# Patient Record
Sex: FEMALE | Race: WHITE | URBAN - METROPOLITAN AREA
[De-identification: names, ages, dates, MRNs, and addresses within clinical notes are randomized per-mention and may not be internally consistent; named-entity substitution may affect disease eponyms.]

---

## 2022-01-14 ENCOUNTER — VIRTUAL VISIT (OUTPATIENT)
Dept: INTERNAL MEDICINE CLINIC | Age: 27
End: 2022-01-14
Payer: MEDICARE

## 2022-01-14 DIAGNOSIS — K21.9 GASTROESOPHAGEAL REFLUX DISEASE WITHOUT ESOPHAGITIS: ICD-10-CM

## 2022-01-14 DIAGNOSIS — F31.31 MILD DEPRESSED BIPOLAR I DISORDER (HCC): ICD-10-CM

## 2022-01-14 DIAGNOSIS — K58.0 IRRITABLE BOWEL SYNDROME WITH DIARRHEA: ICD-10-CM

## 2022-01-14 DIAGNOSIS — M79.10 MYALGIA: ICD-10-CM

## 2022-01-14 PROBLEM — F60.3 BORDERLINE PERSONALITY DISORDER (HCC): Status: ACTIVE | Noted: 2019-08-16

## 2022-01-14 PROBLEM — N83.202 LEFT OVARIAN CYST: Status: ACTIVE | Noted: 2020-06-30

## 2022-01-14 PROBLEM — M54.16 LUMBAR RADICULOPATHY: Status: ACTIVE | Noted: 2018-11-28

## 2022-01-14 PROCEDURE — 99204 OFFICE O/P NEW MOD 45 MIN: CPT | Performed by: NURSE PRACTITIONER

## 2022-01-14 RX ORDER — OMEPRAZOLE 40 MG/1
CAPSULE, DELAYED RELEASE ORAL
COMMUNITY
Start: 2021-09-07

## 2022-01-14 RX ORDER — METHIMAZOLE 5 MG/1
5 TABLET ORAL 2 TIMES DAILY
COMMUNITY
Start: 2021-10-12

## 2022-01-14 RX ORDER — DICYCLOMINE HYDROCHLORIDE 10 MG/1
10 CAPSULE ORAL 4 TIMES DAILY
COMMUNITY
Start: 2021-10-11

## 2022-01-14 ASSESSMENT — PATIENT HEALTH QUESTIONNAIRE - PHQ9
SUM OF ALL RESPONSES TO PHQ QUESTIONS 1-9: 0
1. LITTLE INTEREST OR PLEASURE IN DOING THINGS: 0
2. FEELING DOWN, DEPRESSED OR HOPELESS: 0
SUM OF ALL RESPONSES TO PHQ QUESTIONS 1-9: 0
SUM OF ALL RESPONSES TO PHQ QUESTIONS 1-9: 0
SUM OF ALL RESPONSES TO PHQ9 QUESTIONS 1 & 2: 0
SUM OF ALL RESPONSES TO PHQ QUESTIONS 1-9: 0

## 2022-01-14 ASSESSMENT — ENCOUNTER SYMPTOMS
BACK PAIN: 0
COUGH: 0
DIARRHEA: 1
COLOR CHANGE: 0
CONSTIPATION: 0
SINUS PRESSURE: 0
ABDOMINAL PAIN: 0
WHEEZING: 0
SINUS PAIN: 0
SHORTNESS OF BREATH: 0

## 2022-01-14 NOTE — ASSESSMENT & PLAN NOTE
Stable, controlled on current regimen  Was previously following left side however her psychiatrist has since left the office  She has not yet established with new psychiatrist

## 2022-01-14 NOTE — PROGRESS NOTES
Alethea Humphrey (:  1995) is a 32 y.o. female,Established patient, here for evaluation of the following chief complaint(s): Establish Care       ASSESSMENT/PLAN:  1. Gastroesophageal reflux disease without esophagitis  Assessment & Plan:  Stable, controlled  Continue Prilosec 40 mg  2. Mild depressed bipolar I disorder ([address]  Assessment & Plan:  Stable, controlled on current regimen  Was previously following left side however her psychiatrist has since left the office  She has not yet established with new psychiatrist  3. Irritable bowel syndrome with diarrhea  Assessment & Plan:  Typically well controlled on dicyclomine  We will see how symptoms progress after hyperthyroid is treated  Consider Xifaxan if symptoms persist  4. Myalgia  Assessment & Plan:  Has been ongoing, waxing and waning  No swelling or decreased ROM  Previous CRP, RACHANA, RF negative  We will see in office for full physicalconsider Cymbalta or amitriptyline      No follow-ups on file. SUBJECTIVE/OBJECTIVE:  HPI  Patient is here to establish care. She has previously being seen by a PCP at [facility] but recently had insurance changes and needs to switch PCPs. Reports overactive thyroid that was noted on her routine labs last year. She had an appointment with endocrinology yesterday. She is started Tapazole. Waiting on further labs. She does have a family history of Graves' disease in her father. Reports chronic widespread joint pain that has been ongoing in her shoulders and neck as well as her lower back and thighs. Radiates down the arms and legs. Feels like sharp/ tingling pain. Comes and goes. Started a few years ago. Has not had work up for this in the past. Took gabapentin for psych disorder but helped with the pain. Does not like to take tylenol or ibuprofen. Notices pain daily. She had previous RF, CRP, RACHANA done last year which were negative. Patient has past medical history of IBS.   States her diarrhea symptoms pursuant to the emergency declaration under the [facility], [facility] authority and the [employer] General Act. Patient identification was verified, and a caregiver was present when appropriate. The patient was located in a state where the provider was credentialed to provide care. An electronic signature was used to authenticate this note.     --ASHOK Lee - CNP

## 2022-01-14 NOTE — ASSESSMENT & PLAN NOTE
Has been ongoing, waxing and waning  No swelling or decreased ROM  Previous CRP, RACHANA, RF negative  We will see in office for full physicalconsider Cymbalta or amitriptyline

## 2022-01-14 NOTE — ASSESSMENT & PLAN NOTE
Typically well controlled on dicyclomine  We will see how symptoms progress after hyperthyroid is treated  Consider Xifaxan if symptoms persist

## 2022-04-12 ENCOUNTER — OFFICE VISIT (OUTPATIENT)
Dept: FAMILY MEDICINE CLINIC | Age: 27
End: 2022-04-12
Payer: MEDICARE

## 2022-04-12 VITALS
TEMPERATURE: 97.5 F | OXYGEN SATURATION: 97 % | WEIGHT: 124 LBS | DIASTOLIC BLOOD PRESSURE: 74 MMHG | SYSTOLIC BLOOD PRESSURE: 120 MMHG | HEIGHT: 62 IN | BODY MASS INDEX: 22.82 KG/M2 | HEART RATE: 87 BPM

## 2022-04-12 DIAGNOSIS — E05.00 GRAVES DISEASE: Primary | ICD-10-CM

## 2022-04-12 DIAGNOSIS — K58.0 IRRITABLE BOWEL SYNDROME WITH DIARRHEA: ICD-10-CM

## 2022-04-12 DIAGNOSIS — M54.16 LUMBAR RADICULOPATHY: ICD-10-CM

## 2022-04-12 DIAGNOSIS — M54.11 RADICULOPATHY OF OCCIPITO-ATLANTO-AXIAL REGION: ICD-10-CM

## 2022-04-12 PROBLEM — M79.10 MYALGIA: Status: RESOLVED | Noted: 2022-01-14 | Resolved: 2022-04-12

## 2022-04-12 LAB — PAP SMEAR, EXTERNAL: NORMAL

## 2022-04-12 PROCEDURE — 99213 OFFICE O/P EST LOW 20 MIN: CPT | Performed by: NURSE PRACTITIONER

## 2022-04-12 RX ORDER — LAMOTRIGINE 100 MG/1
100 TABLET ORAL 2 TIMES DAILY
Qty: 30 TABLET | Refills: 3 | Status: SHIPPED | OUTPATIENT
Start: 2022-04-12 | End: 2022-06-20

## 2022-04-12 NOTE — ASSESSMENT & PLAN NOTE
Suspect this to be the cause of her UE tingling. No acute distress at this time. Discussed ergonomics and head and shoulder carriage as cause of some of her discomfort.  Referral to Sports Meds/Chiro for PT and eval.  Follow up as needed

## 2022-04-12 NOTE — PROGRESS NOTES
Nichol Bloom (:  1995) is a 32 y.o. female,Established patient, here for evaluation of the following chief complaint(s):  Joint Pain      ASSESSMENT/PLAN:  1. Graves disease  Assessment & Plan:   Borderline controlled, continue current medications   Tapazole 7.5 mg daily  Due for repeat TSH/T4 in 2-3 weeks  2. Lumbar radiculopathy  Assessment & Plan:  Suspect this to be the cause of her UE tingling. No acute distress at this time. Discussed ergonomics and head and shoulder carriage as cause of some of her discomfort. Referral to Sports Meds/Chiro for PT and eval.  Follow up as needed  Orders:  -     External Referral To Chiropractic  3. Irritable bowel syndrome with diarrhea  Assessment & Plan:  Well controlled with Bentyl  4. Radiculopathy of chogtudr-cmqksjq-konly region  Assessment & Plan:  Suspect this to be the cause of her UE tingling. No acute distress at this time. Discussed ergonomics and head and shoulder carriage as cause of some of her discomfort. Referral to Sports Meds/Chiro for PT and eval.  Follow up as needed      No follow-ups on file. SUBJECTIVE/OBJECTIVE:  Here to establish I think. Has seen multiple providers but wants to establish with Troy Dennis NP. Has h/o graves and is tx with Methimazole. Recent increase to 7.5mg following and elevated T4. Due to be rechecked in 2-3 weeks. Has f/u appt with endo scheduled  Has h/o Bipolar with olvin- feels well controlled on meds. Sees psych for this. Has c/o chronic bilat arm tingling and leg tingling. Often worse at night. No weakness or incont. No headache.   Sits at computer much of day      Current Outpatient Medications   Medication Sig Dispense Refill    lamoTRIgine (LAMICTAL) 100 MG tablet Take 1 tablet by mouth 2 times daily 30 tablet 3    dicyclomine (BENTYL) 10 MG capsule Take 10 mg by mouth 4 times daily      methIMAzole (TAPAZOLE) 5 MG tablet Take 5 mg by mouth 2 times daily      omeprazole (PRILOSEC) 40 MG delayed release capsule TAKE ONE CAPSULE BY MOUTH EVERY MORNING BEFORE BREAKFAST      Levonorgestrel [facility]) IUD 19.5 mg 1 Intra Uterine Device by IntraUTERine route once      clonazePAM (KLONOPIN) 1 MG tablet Take 1 mg by mouth 2 times daily as needed       No current facility-administered medications for this visit. Review of Systems   All other systems reviewed and are negative. Vitals:    04/12/22 1318   BP: 120/74   Site: Left Upper Arm   Position: Sitting   Cuff Size: Medium Adult   Pulse: 87   Temp: 97.5 °F (36.4 °C)   SpO2: 97%   Weight: 124 lb (56.2 kg)   Height: 5' 2\" (1.575 m)       Physical Exam  Constitutional:       Appearance: She is well-developed. HENT:      Head: Normocephalic. Eyes:      Pupils: Pupils are equal, round, and reactive to light. Cardiovascular:      Rate and Rhythm: Normal rate and regular rhythm. Heart sounds: Normal heart sounds. Pulmonary:      Effort: Pulmonary effort is normal.      Breath sounds: Normal breath sounds. Musculoskeletal:         General: Normal range of motion. Cervical back: Normal range of motion. Skin:     General: Skin is warm and dry. Neurological:      Mental Status: She is alert and oriented to person, place, and time. Psychiatric:         Attention and Perception: Attention normal.         Mood and Affect: Mood is anxious. Speech: Speech is rapid and pressured. Behavior: Behavior is hyperactive. Thought Content: Thought content normal.         Cognition and Memory: Cognition normal.         Judgment: Judgment normal.                 An electronic signature was used to authenticate this note.     --ASHOK Pinedo - CNP

## 2022-04-12 NOTE — ASSESSMENT & PLAN NOTE
Borderline controlled, continue current medications   Tapazole 7.5 mg daily  Due for repeat TSH/T4 in 2-3 weeks

## 2022-06-20 RX ORDER — LAMOTRIGINE 100 MG/1
TABLET ORAL
Qty: 30 TABLET | Refills: 3 | Status: SHIPPED | OUTPATIENT
Start: 2022-06-20

## 2022-11-16 ENCOUNTER — TELEPHONE (OUTPATIENT)
Dept: FAMILY MEDICINE CLINIC | Age: 27
End: 2022-11-16

## 2022-11-16 RX ORDER — LAMOTRIGINE 100 MG/1
TABLET ORAL
Qty: 30 TABLET | Refills: 3 | Status: SHIPPED | OUTPATIENT
Start: 2022-11-16 | End: 2022-11-18 | Stop reason: SDUPTHER

## 2022-11-16 NOTE — TELEPHONE ENCOUNTER
Pt lamictal script was only written for 30 tablets, would only last 15 days at one pill twice a day.

## 2022-11-18 ENCOUNTER — TELEPHONE (OUTPATIENT)
Dept: FAMILY MEDICINE CLINIC | Age: 27
End: 2022-11-18

## 2022-11-18 RX ORDER — LAMOTRIGINE 100 MG/1
TABLET ORAL
Qty: 60 TABLET | Refills: 3 | Status: SHIPPED | OUTPATIENT
Start: 2022-11-18 | End: 2022-11-21 | Stop reason: SDUPTHER

## 2022-11-18 NOTE — TELEPHONE ENCOUNTER
Pharmacy is stating that they need a clarification on an rx:    Lamotrigine 100 mg tablets    Is the quantity for a 30 or 90 days? The original rx was written for 15 day.     LOV:  4/12/22

## 2022-11-21 RX ORDER — LAMOTRIGINE 100 MG/1
TABLET ORAL
Qty: 60 TABLET | Refills: 3 | Status: SHIPPED | OUTPATIENT
Start: 2022-11-21

## 2023-02-02 ENCOUNTER — ANESTHESIA EVENT (OUTPATIENT)
Dept: ENDOSCOPY | Age: 28
End: 2023-02-02
Payer: MEDICARE

## 2023-02-03 ENCOUNTER — ANESTHESIA (OUTPATIENT)
Dept: ENDOSCOPY | Age: 28
End: 2023-02-03
Payer: MEDICARE

## 2023-02-03 ENCOUNTER — HOSPITAL ENCOUNTER (OUTPATIENT)
Age: 28
Setting detail: OUTPATIENT SURGERY
Discharge: HOME | End: 2023-02-03
Attending: INTERNAL MEDICINE | Admitting: INTERNAL MEDICINE
Payer: MEDICARE

## 2023-02-03 VITALS
OXYGEN SATURATION: 96 % | WEIGHT: 133 LBS | RESPIRATION RATE: 16 BRPM | TEMPERATURE: 97.4 F | BODY MASS INDEX: 24.48 KG/M2 | SYSTOLIC BLOOD PRESSURE: 119 MMHG | DIASTOLIC BLOOD PRESSURE: 76 MMHG | HEIGHT: 62 IN | HEART RATE: 52 BPM

## 2023-02-03 LAB — PREGNANCY, URINE: NEGATIVE

## 2023-02-03 PROCEDURE — 2500000003 HC RX 250 WO HCPCS: Performed by: NURSE ANESTHETIST, CERTIFIED REGISTERED

## 2023-02-03 PROCEDURE — 6360000002 HC RX W HCPCS: Performed by: NURSE ANESTHETIST, CERTIFIED REGISTERED

## 2023-02-03 PROCEDURE — 3609017100 HC EGD: Performed by: INTERNAL MEDICINE

## 2023-02-03 PROCEDURE — 84703 CHORIONIC GONADOTROPIN ASSAY: CPT

## 2023-02-03 PROCEDURE — 3700000000 HC ANESTHESIA ATTENDED CARE: Performed by: INTERNAL MEDICINE

## 2023-02-03 PROCEDURE — 7100000011 HC PHASE II RECOVERY - ADDTL 15 MIN: Performed by: INTERNAL MEDICINE

## 2023-02-03 PROCEDURE — 7100000010 HC PHASE II RECOVERY - FIRST 15 MIN: Performed by: INTERNAL MEDICINE

## 2023-02-03 RX ORDER — LIDOCAINE HYDROCHLORIDE 20 MG/ML
INJECTION, SOLUTION EPIDURAL; INFILTRATION; INTRACAUDAL; PERINEURAL PRN
Status: DISCONTINUED | OUTPATIENT
Start: 2023-02-03 | End: 2023-02-03 | Stop reason: SDUPTHER

## 2023-02-03 RX ORDER — ETONOGESTREL AND ETHINYL ESTRADIOL 11.7; 2.7 MG/1; MG/1
INSERT, EXTENDED RELEASE VAGINAL
COMMUNITY
Start: 2023-01-08

## 2023-02-03 RX ORDER — SODIUM CHLORIDE 9 MG/ML
INJECTION, SOLUTION INTRAVENOUS PRN
Status: CANCELLED | OUTPATIENT
Start: 2023-02-03

## 2023-02-03 RX ORDER — PROPOFOL 10 MG/ML
INJECTION, EMULSION INTRAVENOUS PRN
Status: DISCONTINUED | OUTPATIENT
Start: 2023-02-03 | End: 2023-02-03 | Stop reason: SDUPTHER

## 2023-02-03 RX ORDER — SODIUM CHLORIDE 0.9 % (FLUSH) 0.9 %
5-40 SYRINGE (ML) INJECTION EVERY 12 HOURS SCHEDULED
Status: CANCELLED | OUTPATIENT
Start: 2023-02-03

## 2023-02-03 RX ORDER — SODIUM CHLORIDE 0.9 % (FLUSH) 0.9 %
5-40 SYRINGE (ML) INJECTION PRN
Status: CANCELLED | OUTPATIENT
Start: 2023-02-03

## 2023-02-03 RX ORDER — ERGOCALCIFEROL 1.25 MG/1
CAPSULE ORAL
COMMUNITY
Start: 2023-01-02

## 2023-02-03 RX ORDER — SODIUM CHLORIDE, SODIUM LACTATE, POTASSIUM CHLORIDE, CALCIUM CHLORIDE 600; 310; 30; 20 MG/100ML; MG/100ML; MG/100ML; MG/100ML
INJECTION, SOLUTION INTRAVENOUS CONTINUOUS
Status: DISCONTINUED | OUTPATIENT
Start: 2023-02-03 | End: 2023-02-03 | Stop reason: HOSPADM

## 2023-02-03 RX ORDER — CETIRIZINE HYDROCHLORIDE 10 MG/1
TABLET ORAL
COMMUNITY
Start: 2022-12-08

## 2023-02-03 RX ADMIN — PROPOFOL 50 MG: 10 INJECTION, EMULSION INTRAVENOUS at 08:00

## 2023-02-03 RX ADMIN — LIDOCAINE HYDROCHLORIDE 5 MG: 20 INJECTION, SOLUTION EPIDURAL; INFILTRATION; INTRACAUDAL; PERINEURAL at 07:58

## 2023-02-03 RX ADMIN — PROPOFOL 150 MG: 10 INJECTION, EMULSION INTRAVENOUS at 07:58

## 2023-02-03 ASSESSMENT — PAIN SCALES - WONG BAKER: WONGBAKER_NUMERICALRESPONSE: 0

## 2023-02-03 ASSESSMENT — PAIN - FUNCTIONAL ASSESSMENT: PAIN_FUNCTIONAL_ASSESSMENT: 0-10

## 2023-02-03 NOTE — ANESTHESIA PRE PROCEDURE
Department of Anesthesiology  Preprocedure Note       Name:  Prem Bhandari   Age:  25 y.o.  :  1995                                          MRN:  [id number]         Date:  2/3/2023      Surgeon: Juju Gilliam):  Susie Cyr MD    Procedure: Procedure(s):  ESOPHAGOGASTRODUODENOSCOPY    Medications prior to admission:   Prior to Admission medications    Medication Sig Start Date End Date Taking? Authorizing Provider   cetirizine (ZYRTEC) 10 MG tablet  22   Historical Provider, MD   vitamin D (ERGOCALCIFEROL) 1.25 MG (29032 UT) CAPS capsule  23   Historical Provider, MD   etonogestrel-ethinyl estradiol ([address]) 0.12-0.015 MG/24HR vaginal ring  23   Historical Provider, MD   lamoTRIgine (LAMICTAL) 100 MG tablet TAKE ONE TABLET BY MOUTH TWICE A DAY 22   ASHOK Bueno - CNP   dicyclomine (BENTYL) 10 MG capsule Take 10 mg by mouth 4 times daily 10/11/21   Historical Provider, MD   methIMAzole (TAPAZOLE) 5 MG tablet Take 5 mg by mouth 2 times daily 10/12/21   Historical Provider, MD   omeprazole (PRILOSEC) 40 MG delayed release capsule TAKE ONE CAPSULE BY MOUTH EVERY MORNING BEFORE BREAKFAST 21   Historical Provider, MD   Levonorgestrel [facility]) IUD 19.5 mg 1 Intra Uterine Device by IntraUTERine route once  Patient not taking: Reported on 2/3/2023 12/17/18 12/17/23  Historical Provider, MD   clonazePAM (KLONOPIN) 1 MG tablet Take 1 mg by mouth 2 times daily as needed    Historical Provider, MD       Current medications:    Current Facility-Administered Medications   Medication Dose Route Frequency Provider Last Rate Last Admin    lactated ringers IV soln infusion   IntraVENous Continuous Jaciel Rodriguez MD           Allergies:     Allergies   Allergen Reactions    Dust Mite Extract Cough     Sneezing, nasal drainage       Problem List:    Patient Active Problem List   Diagnosis Code    Attention deficit disorder F98.8    Bipolar 2 disorder (HCC) F31.81    Borderline personality disorder ([facility]) F60.3   • Depressed bipolar I disorder in full remission ([facility]) F31.76   • Left ovarian cyst N83.202   • Radiculopathy of maroqswa-hdjrrwu-tqczo region M54.11   • Mild depressed bipolar I disorder ([facility]) F31.31   • Irritable bowel syndrome with diarrhea K58.0   • Graves disease E05.00       Past Medical History:        Diagnosis Date   • ADHD (attention deficit hyperactivity disorder)    • Anxiety    • Bipolar 1 disorder ([facility])    • Borderline personality disorder ([facility])    • Hyperthyroidism    • IBS (irritable bowel syndrome)        Past Surgical History:        Procedure Laterality Date   • APPENDECTOMY         Social History:    Social History     Tobacco Use   • Smoking status: Former     Packs/day: 1.00     Years: 5.00     Pack years: 5.00     Types: Cigarettes     Quit date: 2019     Years since quittin.0   • Smokeless tobacco: Never   Substance Use Topics   • Alcohol use: Yes     Comment: rarely                                 Counseling given: Not Answered      Vital Signs (Current):   Vitals:    23 0715   BP: (!) 136/100   Pulse: 52   Resp: 16   Temp: 97.9 °F (36.6 °C)   TempSrc: Temporal   SpO2: 98%   Weight: 133 lb (60.3 kg)   Height: 5' 2\" (1.575 m)                                              BP Readings from Last 3 Encounters:   23 (!) 136/100   22 120/74   16 118/84       NPO Status:                                                                                 BMI:   Wt Readings from Last 3 Encounters:   23 133 lb (60.3 kg)   22 124 lb (56.2 kg)   16 116 lb (52.6 kg)     Body mass index is 24.33 kg/m².    CBC:   Lab Results   Component Value Date/Time    WBC 6.2 2015 12:35 AM    RBC 4.77 2015 12:35 AM    HGB 13.6 2015 12:35 AM    HCT 41.0 2015 12:35 AM    MCV 86.1 2015 12:35 AM    RDW 13.0 2015 12:35 AM     2015 12:35 AM       CMP:   Lab Results   Component Value Date/Time    NA  139 05/25/2015 12:35 AM    K 3.4 05/25/2015 12:35 AM     05/25/2015 12:35 AM    CO2 25 05/25/2015 12:35 AM    BUN 6 05/25/2015 12:35 AM    CREATININE 0.7 05/25/2015 12:35 AM    GFRAA >60 05/25/2015 12:35 AM    LABGLOM >60 05/25/2015 12:35 AM    GLUCOSE 91 05/25/2015 12:35 AM    CALCIUM 9.6 05/25/2015 12:35 AM       POC Tests: No results for input(s): POCGLU, POCNA, POCK, POCCL, POCBUN, POCHEMO, POCHCT in the last 72 hours. Coags: No results found for: PROTIME, INR, APTT    HCG (If Applicable):   Lab Results   Component Value Date    PREGTESTUR Negative 10/12/2015        ABGs: No results found for: PHART, PO2ART, XEI0YKX, AJT5HRF, BEART, H9LQGAUZ     Type & Screen (If Applicable):  No results found for: LABABO, LABRH    Drug/Infectious Status (If Applicable):  No results found for: HIV, HEPCAB    COVID-19 Screening (If Applicable): No results found for: COVID19        Anesthesia Evaluation  Patient summary reviewed  Airway: Mallampati: III  TM distance: >3 FB   Neck ROM: full  Mouth opening: > = 3 FB   Dental: normal exam         Pulmonary:Negative Pulmonary ROS and normal exam                               Cardiovascular:  Exercise tolerance: good (>4 METS),   (+) dysrhythmias (HO slow heart rate. ? junctional rhythm. Lucyann Push ECHO in past):,                   Neuro/Psych:   (+) neuromuscular disease:, psychiatric history: stable with treatment            GI/Hepatic/Renal:             Endo/Other:    (+) hyperthyroidism::., .                 Abdominal:             Vascular: Other Findings:           Anesthesia Plan      MAC     ASA 2       Induction: intravenous. Anesthetic plan and risks discussed with patient.         Attending anesthesiologist reviewed and agrees with Anne Antonio MD   2/3/2023

## 2023-02-03 NOTE — PROGRESS NOTES
Ambulatory Surgery/Procedure Discharge Note    Vitals:    02/03/23 0815   BP: 119/76   Pulse: 52   Resp: 16   Temp: 97.4 °F (36.3 °C)   SpO2: 96%       No intake/output data recorded. Restroom use offered before discharge. Yes  Pt tolerates PO well and denies nausea. Refuse toileting and pt is alert oriented. Pt refuse to be wheeled on wheelchair. Discharge instructions were read and report handed by Dr. Liban Prasad and lengthy discussion about her findings. Pain assessment:  none  Pain Level: 0        Patient discharged to home/self care.  Patient discharged and walk with her family/S.O.       2/3/2023 8:38 AM

## 2023-02-03 NOTE — H&P
Gastroenterology Note                 Pre-operative History and Physical    Patient: Monet Huizar  : 1995  CSN:     History Obtained From:   Patient or guardian. HISTORY OF PRESENT ILLNESS:    The patient is a [address] [place] y.o. female here for EGD for heartburn, cough on omep 40mg qd. Past Medical History:    Past Medical History:   Diagnosis Date    Anxiety     Bipolar 1 disorder ([facility]     Borderline personality disorder ([facility]     Graves disease     Hyperthyroidism     IBS (irritable bowel syndrome)     Junctional rhythm      Past Surgical History:    Past Surgical History:   Procedure Laterality Date    APPENDECTOMY      ENDOSCOPY, COLON, DIAGNOSTIC       Medications Prior to Admission:   No current facility-administered medications on file prior to encounter.      Current Outpatient Medications on File Prior to Encounter   Medication Sig Dispense Refill    cetirizine (ZYRTEC) 10 MG tablet       vitamin D (ERGOCALCIFEROL) 1.25 MG (80799 UT) CAPS capsule       etonogestrel-ethinyl estradiol (NUVARING) 0.12-0.015 MG/24HR vaginal ring       lamoTRIgine (LAMICTAL) 100 MG tablet TAKE ONE TABLET BY MOUTH TWICE A DAY 60 tablet 3    dicyclomine (BENTYL) 10 MG capsule Take 10 mg by mouth 4 times daily      methIMAzole (TAPAZOLE) 5 MG tablet Take 5 mg by mouth 2 times daily      omeprazole (PRILOSEC) 40 MG delayed release capsule TAKE ONE CAPSULE BY MOUTH EVERY MORNING BEFORE BREAKFAST      Levonorgestrel (KYLEENA) IUD 19.5 mg 1 Intra Uterine Device by IntraUTERine route once (Patient not taking: Reported on 2/3/2023)      clonazePAM (KLONOPIN) 1 MG tablet Take 1 mg by mouth 2 times daily as needed          Allergies:  Dust mite extract      Social History:   Social History     Tobacco Use    Smoking status: Former     Packs/day: 1.00     Years: 5.00     Pack years: 5.00     Types: Cigarettes     Quit date: 2019     Years since quittin.0    Smokeless tobacco: Never   Substance Use Topics Alcohol use: Yes     Comment: rarely      Family History:   Family History   Problem Relation Age of Onset    Diabetes Mother     Other Mother         hypothyroid    Other Father         Grave's disease    Diabetes Father     High Cholesterol Father     Stroke Father     Atrial Fibrillation Father     Cancer Maternal Uncle         colon    Stroke Maternal Grandfather     Heart Attack Maternal Grandfather 40       PHYSICAL EXAM:      BP (!) 136/100   Pulse 52   Temp 97.9 °F (36.6 °C) (Temporal)   Resp 16   Ht 5' 2\" (1.575 m)   Wt 133 lb (60.3 kg)   SpO2 98%   BMI 24.33 kg/m²  I        Heart:  RRR, normal s1s2    Lungs:  CTA and normal effort    Abdomen:   Soft, mildly tender to palpation in the suprapubic region, no rebound or guarding, no masses        ASSESSMENT AND PLAN:    1. Patient is a 25 y.o. female here for endoscopy with MAC sedation. 2.  Procedure options, risks and benefits reviewed with patient and/or guardian. They express understanding.      Gideon Cleary MD  [address] [place] [address]

## 2023-02-03 NOTE — DISCHARGE INSTRUCTIONS
ENDOSCOPY DISCHARGE INSTRUCTIONS:    Call the physician that did your procedure for any questions or concern:    [facility]: [phone]  DR. DONALD GONZALEZ      ACTIVITY:    There are potential side effects to the medications used for sedation and anesthesia during your procedure. These include:  Dizziness or light-headedness, confusion or memory loss, delayed reaction times, loss of coordination, nausea and vomiting. Because of your increased risk for injury, we ask that you observe the following precautions: For the next 24 hours,  DO NOT operate an automobile, bicycle, motorcycle, , power tools or large equipment of any kind. Do not drink alcohol, sign any legal documents or make any legal decisions for 24 hours. Do not bend your head over lower than your heart. DO sit on the side of bed/couch awhile before getting up. Plan on bedrest or quiet relaxation today. You may resume normal activities in 24 hours. DIET:    Your first meal today should be light, avoiding spicy and fatty foods. If you tolerate this first meal, then you may advance to your regular diet unless otherwise advised by your physician. NORMAL SYMPTOMS:  -Mild sore throat if youve had an EGD   -Gaseous discomfort    NOTIFY YOUR PHYSICIAN IF THESE SYMPTOMS OCCUR:  1. Fever (greater than 100)  5. Increased abdominal bloating  2. Severe pain    6. Excessive bleeding  3. Nausea and vomiting  7. Chest pain                                                                    4. Chills    8. Shortness of breath    ADDITIONAL INSTRUCTIONS:    Biopsy results: NONE    Educational Information:          Please review these discharge instructions this evening or tomorrow for  information you may have forgotten. We want to thank you for choosing the [facility] as your health care provider. We always strive to provide you with excellent care while you are here.  You may receive a survey in the mail regarding your care. We would appreciate you taking a few minutes of your time to complete this survey.

## 2023-02-03 NOTE — ANESTHESIA POSTPROCEDURE EVALUATION
Department of Anesthesiology  Postprocedure Note    Patient: Koko Bowers  MRN: [id number]  YOB: 1995  Date of evaluation: 2/3/2023      Procedure Summary     Date: 02/03/23 Room / Location: [facility]    Anesthesia Start: 8878 Anesthesia Stop: 6322    Procedure: ESOPHAGOGASTRODUODENOSCOPY Diagnosis:       Irritable bowel syndrome with constipation      Lower abdominal pain      Gastroesophageal reflux disease, unspecified whether esophagitis present      Early satiety      Cough, unspecified type      (Irritable bowel syndrome with constipation [K58.1])      (Lower abdominal pain [R10.30])      (Gastroesophageal reflux disease, unspecified whether esophagitis present [K21.9])      (Early satiety [R68.81])      (Cough, unspecified type [R05.9])    Surgeons: Rj Hunter MD Responsible Provider: Jarret Sanchez MD    Anesthesia Type: MAC ASA Status: 2          Anesthesia Type: No value filed.     Jose Angel Phase I: Jose Angel Score: 10    Jose Angel Phase II: Jose Angel Score: 10      Anesthesia Post Evaluation    Patient location during evaluation: PACU  Patient participation: complete - patient participated  Level of consciousness: awake  Pain score: 0  Airway patency: patent  Nausea & Vomiting: no nausea  Complications: no  Cardiovascular status: hemodynamically stable  Respiratory status: acceptable  Hydration status: stable

## 2023-03-03 DIAGNOSIS — R10.30 LOWER ABDOMINAL PAIN: Primary | ICD-10-CM

## 2023-03-03 DIAGNOSIS — K58.0 IRRITABLE BOWEL SYNDROME WITH DIARRHEA: ICD-10-CM

## 2023-08-15 ENCOUNTER — HOSPITAL ENCOUNTER (OUTPATIENT)
Dept: ULTRASOUND IMAGING | Age: 28
Discharge: HOME | End: 2023-08-15
Payer: MEDICARE

## 2023-08-15 DIAGNOSIS — R10.11 ABDOMINAL PAIN, RIGHT UPPER QUADRANT: ICD-10-CM

## 2023-08-15 PROCEDURE — 76705 ECHO EXAM OF ABDOMEN: CPT

## 2023-11-07 PROBLEM — F31.31 MILD DEPRESSED BIPOLAR I DISORDER (HCC): Status: RESOLVED | Noted: 2022-01-14 | Resolved: 2023-11-07

## 2023-11-13 PROBLEM — F60.3 BORDERLINE PERSONALITY DISORDER (HCC): Status: RESOLVED | Noted: 2019-08-16 | Resolved: 2023-11-13

## 2024-01-15 DIAGNOSIS — K63.3: Primary | ICD-10-CM
